# Patient Record
Sex: MALE | Race: BLACK OR AFRICAN AMERICAN | NOT HISPANIC OR LATINO | URBAN - METROPOLITAN AREA
[De-identification: names, ages, dates, MRNs, and addresses within clinical notes are randomized per-mention and may not be internally consistent; named-entity substitution may affect disease eponyms.]

---

## 2018-02-16 ENCOUNTER — OFFICE VISIT - HEALTHEAST (OUTPATIENT)
Dept: FAMILY MEDICINE | Facility: CLINIC | Age: 23
End: 2018-02-16

## 2018-02-16 ENCOUNTER — RECORDS - HEALTHEAST (OUTPATIENT)
Dept: GENERAL RADIOLOGY | Facility: CLINIC | Age: 23
End: 2018-02-16

## 2018-02-16 DIAGNOSIS — S93.402A SPRAIN OF UNSPECIFIED LIGAMENT OF LEFT ANKLE, INITIAL ENCOUNTER: ICD-10-CM

## 2018-02-16 DIAGNOSIS — S93.402A SPRAIN OF LEFT ANKLE, UNSPECIFIED LIGAMENT, INITIAL ENCOUNTER: ICD-10-CM

## 2018-02-16 ASSESSMENT — MIFFLIN-ST. JEOR: SCORE: 1771.91

## 2018-03-16 ENCOUNTER — OFFICE VISIT - HEALTHEAST (OUTPATIENT)
Dept: FAMILY MEDICINE | Facility: CLINIC | Age: 23
End: 2018-03-16

## 2018-03-16 DIAGNOSIS — N48.9 PENILE LESION: ICD-10-CM

## 2018-03-16 DIAGNOSIS — Z00.00 ROUTINE GENERAL MEDICAL EXAMINATION AT A HEALTH CARE FACILITY: ICD-10-CM

## 2018-03-16 DIAGNOSIS — Z11.3 SCREENING FOR STD (SEXUALLY TRANSMITTED DISEASE): ICD-10-CM

## 2018-03-16 LAB — HIV 1+2 AB+HIV1 P24 AG SERPL QL IA: NEGATIVE

## 2018-03-16 ASSESSMENT — MIFFLIN-ST. JEOR: SCORE: 1785.07

## 2018-03-17 LAB — T PALLIDUM AB SER QL: NEGATIVE

## 2018-03-19 ENCOUNTER — COMMUNICATION - HEALTHEAST (OUTPATIENT)
Dept: FAMILY MEDICINE | Facility: CLINIC | Age: 23
End: 2018-03-19

## 2018-03-19 LAB
C TRACH DNA SPEC QL PROBE+SIG AMP: NEGATIVE
N GONORRHOEA DNA SPEC QL NAA+PROBE: NEGATIVE

## 2018-05-11 ENCOUNTER — OFFICE VISIT - HEALTHEAST (OUTPATIENT)
Dept: FAMILY MEDICINE | Facility: CLINIC | Age: 23
End: 2018-05-11

## 2018-05-11 DIAGNOSIS — B08.1 MOLLUSCUM CONTAGIOSUM: ICD-10-CM

## 2018-05-11 ASSESSMENT — MIFFLIN-ST. JEOR: SCORE: 1776.45

## 2018-10-11 ENCOUNTER — OFFICE VISIT - HEALTHEAST (OUTPATIENT)
Dept: FAMILY MEDICINE | Facility: CLINIC | Age: 23
End: 2018-10-11

## 2018-10-11 DIAGNOSIS — Z11.3 SCREENING FOR STD (SEXUALLY TRANSMITTED DISEASE): ICD-10-CM

## 2018-10-11 DIAGNOSIS — Z20.2 EXPOSURE TO CHLAMYDIA: ICD-10-CM

## 2018-10-11 ASSESSMENT — MIFFLIN-ST. JEOR: SCORE: 1718.84

## 2018-10-15 ENCOUNTER — AMBULATORY - HEALTHEAST (OUTPATIENT)
Dept: FAMILY MEDICINE | Facility: CLINIC | Age: 23
End: 2018-10-15

## 2018-10-15 LAB
C TRACH DNA SPEC QL PROBE+SIG AMP: POSITIVE
N GONORRHOEA DNA SPEC QL NAA+PROBE: NEGATIVE

## 2018-10-16 ENCOUNTER — COMMUNICATION - HEALTHEAST (OUTPATIENT)
Dept: FAMILY MEDICINE | Facility: CLINIC | Age: 23
End: 2018-10-16

## 2018-10-17 ENCOUNTER — COMMUNICATION - HEALTHEAST (OUTPATIENT)
Dept: FAMILY MEDICINE | Facility: CLINIC | Age: 23
End: 2018-10-17

## 2018-10-19 ENCOUNTER — AMBULATORY - HEALTHEAST (OUTPATIENT)
Dept: FAMILY MEDICINE | Facility: CLINIC | Age: 23
End: 2018-10-19

## 2018-10-19 ENCOUNTER — COMMUNICATION - HEALTHEAST (OUTPATIENT)
Dept: SCHEDULING | Facility: CLINIC | Age: 23
End: 2018-10-19

## 2018-10-19 DIAGNOSIS — Z20.2 EXPOSURE TO CHLAMYDIA: ICD-10-CM

## 2018-11-27 ENCOUNTER — COMMUNICATION - HEALTHEAST (OUTPATIENT)
Dept: FAMILY MEDICINE | Facility: CLINIC | Age: 23
End: 2018-11-27

## 2018-11-30 ENCOUNTER — OFFICE VISIT - HEALTHEAST (OUTPATIENT)
Dept: FAMILY MEDICINE | Facility: CLINIC | Age: 23
End: 2018-11-30

## 2018-11-30 DIAGNOSIS — Z20.2 EXPOSURE TO CHLAMYDIA: ICD-10-CM

## 2018-11-30 DIAGNOSIS — Z11.3 SCREENING EXAMINATION FOR STD (SEXUALLY TRANSMITTED DISEASE): ICD-10-CM

## 2018-11-30 ASSESSMENT — MIFFLIN-ST. JEOR: SCORE: 1707.5

## 2021-06-01 VITALS — BODY MASS INDEX: 24.47 KG/M2 | WEIGHT: 170.9 LBS | HEIGHT: 70 IN

## 2021-06-01 VITALS — BODY MASS INDEX: 24.32 KG/M2 | WEIGHT: 169.9 LBS | HEIGHT: 70 IN

## 2021-06-01 VITALS — WEIGHT: 172.8 LBS | BODY MASS INDEX: 24.74 KG/M2 | HEIGHT: 70 IN

## 2021-06-02 VITALS — WEIGHT: 158.2 LBS | HEIGHT: 70 IN | BODY MASS INDEX: 22.65 KG/M2

## 2021-06-02 VITALS — HEIGHT: 70 IN | BODY MASS INDEX: 22.29 KG/M2 | WEIGHT: 155.7 LBS

## 2021-06-16 NOTE — PROGRESS NOTES
Assessment and Plan:     1. Sprain of left ankle, unspecified ligament, initial encounter  X-ray shows no obvious fracture.  Discussed symptomatic treatment including rest, ice, elevation, and ibuprofen.  Provided crutches and an ankle brace to be worn for 1 week.  If no improvement in symptoms in 1-2 weeks, suggest follow-up for repeat x-ray or further evaluation with physical therapy.  He is content with the plan.  - XR Ankle Left 3 or More VWS; Future      Subjective:     Ivan is a 22 y.o. male presenting to the clinic for concerns for left ankle injury.  Patient states he was stepping onto a step in front of his house when he slipped on the ice and landed on his left ankle.  He was not able to ambulate initially.  He is able to bear minimal weight now.  States he ambulates with a limp.  He complains of swelling both laterally and medially within the ankle.  He complains of an intermittent throb which is exacerbated with movement.  Rest provides assistance.  He is not taking any pain medication.  He rates his pain a 6/10.  Patient has a history of skateboarding and believes he has sustained multiple sprains in the past.  He denies history of fractures to the area.    Review of Systems: A complete 14 point review of systems was obtained and is negative or as stated in the history of present illness.    Social History     Social History     Marital status: Single     Spouse name: N/A     Number of children: N/A     Years of education: N/A     Occupational History     Not on file.     Social History Main Topics     Smoking status: Current Every Day Smoker     Smokeless tobacco: Never Used     Alcohol use Not on file     Drug use: Not on file     Sexual activity: Not on file     Other Topics Concern     Not on file     Social History Narrative     No narrative on file       Active Ambulatory Problems     Diagnosis Date Noted     No Active Ambulatory Problems     Resolved Ambulatory Problems     Diagnosis Date Noted  "    No Resolved Ambulatory Problems     No Additional Past Medical History       No family history on file.    Objective:     /66  Pulse 84  Ht 5' 10\" (1.778 m)  Wt 169 lb 14.4 oz (77.1 kg)  BMI 24.38 kg/m2    Patient is alert, in no obvious distress.   Skin: Warm, dry.  Musculoskeletal: Patient bears minimal weight to the left foot and ankle.  He has limited range of motion with inversion and eversion.  Pedal pulses present and palpable sensations intact.  Feet are warm to touch.  The anterior drawer and talar tilt tests are negative.  He has moderate swelling within the lateral malleolus.    LABORATORY: I ordered and personally reviewed left ankle x-rays x-rays showing no obvious fracture.  Will have radiology review.                  "

## 2021-06-16 NOTE — PROGRESS NOTES
Assessment and Plan:     1. Routine general medical examination at a health care facility  Discussed consuming a healthy diet and exercising.  Discussed importance routine sunscreen use.  He declines vaccines today.    2. Screening for STD (sexually transmitted disease)  Discussed safe sex practices.  Will notify patient of results.  - Chlamydia trachomatis & Neisseria gonorrhoeae, Amplified Detection  - HIV Antigen/Antibody Screening Reader  - Syphilis Screen, [facility]    3. Penile lesion  Patient was concerned of genital warts.  They do not have the typical appearance of warts.  Differentials include molluscum, pearly papules, dermatitis.  Will check STD screening.  Patient states they are improving so he will continue to monitor them.  If symptoms persist, will refer to dermatology.  He is content with the plan.    Subjective:     Ivan is a 22 y.o. male presenting to the clinic for a male physical.  Patient does not consume a healthy diet or exercise.  He is single and is concerned for STDs.  Patient states 1-2 months ago, he noticed bumps on his penis.  He states they are nonpainful.  They are starting to fade.  He has not had any penile discharge.  He denies dysuria or any difficulty with urination.  He occasionally uses condoms with sexual intercourse.  He is feeling well and has no other concerns today.    Review of Systems: A complete 14 point review of systems was obtained and is negative or as stated in the history of present illness.    Social History     Social History     Marital status: Single     Spouse name: N/A     Number of children: N/A     Years of education: N/A     Occupational History     Not on file.     Social History Main Topics     Smoking status: Current Every Day Smoker     Packs/day: 0.25     Smokeless tobacco: Current User      Comment: e-cig     Alcohol use 1.2 oz/week     1 Cans of beer, 1 Shots of liquor per week     Drug use: Yes     Special: Marijuana     Sexual activity: Not on  "file      Comment: single      Other Topics Concern     Not on file     Social History Narrative       Active Ambulatory Problems     Diagnosis Date Noted     No Active Ambulatory Problems     Resolved Ambulatory Problems     Diagnosis Date Noted     No Resolved Ambulatory Problems     Past Medical History:   Diagnosis Date     Anxiety      Environmental allergies        Family History   Problem Relation Age of Onset     No Medical Problems Mother      No Medical Problems Father        Objective:     /70  Pulse 78  Ht 5' 10\" (1.778 m)  Wt 172 lb 12.8 oz (78.4 kg)  SpO2 100%  BMI 24.79 kg/m2    /70  Pulse 78  Ht 5' 10\" (1.778 m)  Wt 172 lb 12.8 oz (78.4 kg)  SpO2 100%  BMI 24.79 kg/m2    General Appearance:    Alert, cooperative, no distress, appears stated age   Head:    Normocephalic, without obvious abnormality, atraumatic   Eyes:    PERRL, conjunctiva/corneas clear, EOM's intact, fundi     benign, both eyes        Ears:    Normal TM's and external ear canals, both ears   Nose:   Nares normal, septum midline, mucosa normal, no drainage    or sinus tenderness   Throat:   Lips, mucosa, and tongue normal; teeth and gums normal   Neck:   Supple, symmetrical, trachea midline, no adenopathy;        thyroid:  No enlargement/tenderness/nodules; no carotid    bruit or JVD   Back:     Symmetric, no curvature, ROM normal, no CVA tenderness   Lungs:     Clear to auscultation bilaterally, respirations unlabored   Chest wall:    No tenderness or deformity   Heart:    Regular rate and rhythm, S1 and S2 normal, no murmur, rub    or gallop   Abdomen:     Soft, non-tender, bowel sounds active all four quadrants,     no masses, no organomegaly   Genitalia:   He has five small smooth pearly appearing slightly raised lesions within the mid shaft of the penis.  Testicles descended x 2 without nodularities.  No hernias palpated bilaterally.        Extremities:   Extremities normal, atraumatic, no cyanosis or edema "   Pulses:   2+ and symmetric all extremities   Skin:   Skin color, texture, turgor normal, no rashes or lesions   Lymph nodes:   Cervical, supraclavicular, and axillary nodes normal   Neurologic:   CNII-XII intact. Normal strength, sensation and reflexes       throughout

## 2021-06-18 NOTE — PROGRESS NOTES
Assessment and Plan:     1. Molluscum contagiosum  Ambulatory referral to Dermatology     I suspect patient has molluscum contagiosum.  Discussed that this can be sexually transmitted.  Patient would like treatment for this.  Will refer to dermatology for further evaluation and treatment.  He is content with the plan.    Subjective:     Ivan is a 22 y.o. male presenting to the clinic for concerns for penile lesions.  Patient states he has had the penile lesions for 4 months.  They are nonpainful.  They are intermittent.  Patient states that he will have a few appear and then a few disappear.  He has not had any penile discharge.  He denies dysuria or difficulty with urination.  He is single and is sexually active.  He occasionally uses condoms with sexual intercourse.  He was seen previously on 3/16/18 where he tested negative for gonorrhea, chlamydia, HIV, syphilis.    Review of Systems: A complete 14 point review of systems was obtained and is negative or as stated in the history of present illness.    Social History     Social History     Marital status: Single     Spouse name: N/A     Number of children: N/A     Years of education: N/A     Occupational History     Not on file.     Social History Main Topics     Smoking status: Current Every Day Smoker     Packs/day: 0.25     Smokeless tobacco: Current User      Comment: e-cig     Alcohol use 1.2 oz/week     1 Cans of beer, 1 Shots of liquor per week     Drug use: Yes     Special: Marijuana     Sexual activity: Not on file      Comment: single      Other Topics Concern     Not on file     Social History Narrative       Active Ambulatory Problems     Diagnosis Date Noted     No Active Ambulatory Problems     Resolved Ambulatory Problems     Diagnosis Date Noted     No Resolved Ambulatory Problems     Past Medical History:   Diagnosis Date     Anxiety      Environmental allergies        Family History   Problem Relation Age of Onset     No Medical Problems Mother   "    No Medical Problems Father        Objective:     /86  Pulse 90  Ht 5' 10\" (1.778 m)  Wt 170 lb 14.4 oz (77.5 kg)  BMI 24.52 kg/m2    Patient is alert, in no obvious distress.   Skin: Warm, dry.  .   :  He has multiple small dome shaped pearly appearing lesions on the shaft of his penis.               "

## 2021-06-20 NOTE — PROGRESS NOTES
Assessment and Plan:     1. Exposure to chlamydia  Due to exposure, will treat with azithromycin.  Educated on its indications and side effects.   His most recent partner needs to be treated prior to having sexual intercourse.  I encouraged him to wait 7 days.  Discussed safe sex practices.  He is content with the plan.  - azithromycin (ZITHROMAX) 500 MG tablet; Take 2 tablets (1,000 mg total) by mouth once for 1 dose.  Dispense: 2 tablet; Refill: 0    2. Screening for STD (sexually transmitted disease)  - Chlamydia trachomatis & Neisseria gonorrhoeae, Amplified Detection    Subjective:     Ivan is a 22 y.o. male presenting to the clinic for STD screening.  Patient is single and is sexually active.  His most recent partner tested positive for chlamydia.  He denies any symptoms including penile discharge or dysuria.  Patient was diagnosed with molluscum earlier in the year and saw dermatology.  He states those lesions have resolved.    Review of Systems: A complete 14 point review of systems was obtained and is negative or as stated in the history of present illness.    Social History     Social History     Marital status: Single     Spouse name: N/A     Number of children: N/A     Years of education: N/A     Occupational History     Not on file.     Social History Main Topics     Smoking status: Current Every Day Smoker     Packs/day: 0.25     Smokeless tobacco: Current User      Comment: e-cig     Alcohol use 1.2 oz/week     1 Cans of beer, 1 Shots of liquor per week     Drug use: Yes     Special: Marijuana     Sexual activity: Not on file      Comment: single      Other Topics Concern     Not on file     Social History Narrative       Active Ambulatory Problems     Diagnosis Date Noted     No Active Ambulatory Problems     Resolved Ambulatory Problems     Diagnosis Date Noted     No Resolved Ambulatory Problems     Past Medical History:   Diagnosis Date     Anxiety      Environmental allergies        Family  "History   Problem Relation Age of Onset     No Medical Problems Mother      No Medical Problems Father        Objective:     /68  Pulse 60  Ht 5' 10\" (1.778 m)  Wt 158 lb 3.2 oz (71.8 kg)  BMI 22.7 kg/m2    Patient is alert, in no obvious distress.   Skin: Warm, dry.    Lungs:  Clear to auscultation. Respirations even and unlabored.  No wheezing or rales noted.   Heart:  Regular rate and rhythm.  No murmurs.  Abdomen: Soft, nontender.  No organomegaly. Bowel sounds normoactive. No guarding or masses noted.   :  Deferred per patient.      LABORATORY: Gonorrhea and Chlamydia ordered            "

## 2021-06-22 NOTE — PROGRESS NOTES
Assessment and Plan:     1. Screening examination for STD (sexually transmitted disease)  Chlamydia trachomatis & Neisseria gonorrhoeae, Amplified Detection   2. Exposure to chlamydia  azithromycin (ZITHROMAX) 500 MG tablet     Discussed safe sex practices.  Will treat with azithromycin 1 g once.  We discussed that his partner is likely having sexual relations with another partner.  Discussed the importance of using condoms with sexual intercourse.  Will notify patient of results.  He is content with the plan.    Subjective:     Ivan is a 22 y.o. male presenting to the clinic for STD screening.  Patient's girlfriend recently tested positive for chlamydia.  Patient tested positive for chlamydia on 10/11/18.  He was in the same relationship at that time.  He and his girlfriend were both treated with azithromycin.  Patient is asymptomatic today.  He denies penile discharge, penile sores, dysuria.  Patient states he does not use condoms with sexual intercourse but plans on starting.    Review of Systems: A complete 14 point review of systems was obtained and is negative or as stated in the history of present illness.    Social History     Socioeconomic History     Marital status: Single     Spouse name: Not on file     Number of children: Not on file     Years of education: Not on file     Highest education level: Not on file   Social Needs     Financial resource strain: Not on file     Food insecurity - worry: Not on file     Food insecurity - inability: Not on file     Transportation needs - medical: Not on file     Transportation needs - non-medical: Not on file   Occupational History     Not on file   Tobacco Use     Smoking status: Current Every Day Smoker     Packs/day: 0.25     Smokeless tobacco: Current User     Tobacco comment: e-cig   Substance and Sexual Activity     Alcohol use: Yes     Alcohol/week: 1.2 oz     Types: 1 Cans of beer, 1 Shots of liquor per week     Drug use: Yes     Types: Marijuana      "Sexual activity: Not on file     Comment: single    Other Topics Concern     Not on file   Social History Narrative     Not on file       Active Ambulatory Problems     Diagnosis Date Noted     No Active Ambulatory Problems     Resolved Ambulatory Problems     Diagnosis Date Noted     No Resolved Ambulatory Problems     Past Medical History:   Diagnosis Date     Anxiety      Environmental allergies        Family History   Problem Relation Age of Onset     No Medical Problems Mother      No Medical Problems Father        Objective:     /70   Pulse 66   Ht 5' 10\" (1.778 m)   Wt 155 lb 11.2 oz (70.6 kg)   BMI 22.34 kg/m      Patient is alert, in no obvious distress.   Skin: Warm, dry.    Lungs:  Clear to auscultation. Respirations even and unlabored.  No wheezing or rales noted.   Heart:  Regular rate and rhythm.  No murmurs.  Abdomen: Soft, nontender.  No organomegaly. Bowel sounds normoactive. No guarding or masses noted.                "

## 2021-06-27 ENCOUNTER — HEALTH MAINTENANCE LETTER (OUTPATIENT)
Age: 26
End: 2021-06-27

## 2021-10-17 ENCOUNTER — HEALTH MAINTENANCE LETTER (OUTPATIENT)
Age: 26
End: 2021-10-17

## 2022-07-23 ENCOUNTER — HEALTH MAINTENANCE LETTER (OUTPATIENT)
Age: 27
End: 2022-07-23

## 2022-10-01 ENCOUNTER — HEALTH MAINTENANCE LETTER (OUTPATIENT)
Age: 27
End: 2022-10-01

## 2023-08-12 ENCOUNTER — HEALTH MAINTENANCE LETTER (OUTPATIENT)
Age: 28
End: 2023-08-12